# Patient Record
Sex: FEMALE | Race: BLACK OR AFRICAN AMERICAN | NOT HISPANIC OR LATINO | URBAN - METROPOLITAN AREA
[De-identification: names, ages, dates, MRNs, and addresses within clinical notes are randomized per-mention and may not be internally consistent; named-entity substitution may affect disease eponyms.]

---

## 2022-10-26 DIAGNOSIS — R19.7 DIARRHEA, UNSPECIFIED TYPE: Primary | ICD-10-CM

## 2023-02-15 ENCOUNTER — OFFICE VISIT (OUTPATIENT)
Dept: GASTROENTEROLOGY | Facility: CLINIC | Age: 28
End: 2023-02-15
Payer: MEDICAID

## 2023-02-15 VITALS
HEART RATE: 63 BPM | SYSTOLIC BLOOD PRESSURE: 121 MMHG | DIASTOLIC BLOOD PRESSURE: 75 MMHG | HEIGHT: 65 IN | BODY MASS INDEX: 26.69 KG/M2 | RESPIRATION RATE: 16 BRPM | WEIGHT: 160.19 LBS | OXYGEN SATURATION: 100 % | TEMPERATURE: 98 F

## 2023-02-15 DIAGNOSIS — R10.9 STOMACH CRAMPS: Primary | ICD-10-CM

## 2023-02-15 DIAGNOSIS — K21.9 GASTROESOPHAGEAL REFLUX DISEASE, UNSPECIFIED WHETHER ESOPHAGITIS PRESENT: ICD-10-CM

## 2023-02-15 DIAGNOSIS — R19.7 DIARRHEA, UNSPECIFIED TYPE: Primary | ICD-10-CM

## 2023-02-15 DIAGNOSIS — K52.9 CHRONIC DIARRHEA: ICD-10-CM

## 2023-02-15 DIAGNOSIS — Z72.0 TOBACCO USER: ICD-10-CM

## 2023-02-15 DIAGNOSIS — R63.4 WEIGHT LOSS: ICD-10-CM

## 2023-02-15 PROCEDURE — 1159F PR MEDICATION LIST DOCUMENTED IN MEDICAL RECORD: ICD-10-PCS | Mod: CPTII,,, | Performed by: NURSE PRACTITIONER

## 2023-02-15 PROCEDURE — 1160F RVW MEDS BY RX/DR IN RCRD: CPT | Mod: CPTII,,, | Performed by: NURSE PRACTITIONER

## 2023-02-15 PROCEDURE — 3078F DIAST BP <80 MM HG: CPT | Mod: CPTII,,, | Performed by: NURSE PRACTITIONER

## 2023-02-15 PROCEDURE — 1159F MED LIST DOCD IN RCRD: CPT | Mod: CPTII,,, | Performed by: NURSE PRACTITIONER

## 2023-02-15 PROCEDURE — 3008F PR BODY MASS INDEX (BMI) DOCUMENTED: ICD-10-PCS | Mod: CPTII,,, | Performed by: NURSE PRACTITIONER

## 2023-02-15 PROCEDURE — 1160F PR REVIEW ALL MEDS BY PRESCRIBER/CLIN PHARMACIST DOCUMENTED: ICD-10-PCS | Mod: CPTII,,, | Performed by: NURSE PRACTITIONER

## 2023-02-15 PROCEDURE — 99204 OFFICE O/P NEW MOD 45 MIN: CPT | Mod: S$PBB,,, | Performed by: NURSE PRACTITIONER

## 2023-02-15 PROCEDURE — 3008F BODY MASS INDEX DOCD: CPT | Mod: CPTII,,, | Performed by: NURSE PRACTITIONER

## 2023-02-15 PROCEDURE — 99214 OFFICE O/P EST MOD 30 MIN: CPT | Mod: PBBFAC | Performed by: NURSE PRACTITIONER

## 2023-02-15 PROCEDURE — 3078F PR MOST RECENT DIASTOLIC BLOOD PRESSURE < 80 MM HG: ICD-10-PCS | Mod: CPTII,,, | Performed by: NURSE PRACTITIONER

## 2023-02-15 PROCEDURE — 3074F PR MOST RECENT SYSTOLIC BLOOD PRESSURE < 130 MM HG: ICD-10-PCS | Mod: CPTII,,, | Performed by: NURSE PRACTITIONER

## 2023-02-15 PROCEDURE — 99204 PR OFFICE/OUTPT VISIT, NEW, LEVL IV, 45-59 MIN: ICD-10-PCS | Mod: S$PBB,,, | Performed by: NURSE PRACTITIONER

## 2023-02-15 PROCEDURE — 3074F SYST BP LT 130 MM HG: CPT | Mod: CPTII,,, | Performed by: NURSE PRACTITIONER

## 2023-02-15 RX ORDER — TRAZODONE HYDROCHLORIDE 50 MG/1
50 TABLET ORAL NIGHTLY
COMMUNITY
Start: 2023-01-27

## 2023-02-15 RX ORDER — SODIUM, POTASSIUM,MAG SULFATES 17.5-3.13G
1 SOLUTION, RECONSTITUTED, ORAL ORAL DAILY
Qty: 1 KIT | Refills: 0 | Status: SHIPPED | OUTPATIENT
Start: 2023-02-15 | End: 2023-02-17

## 2023-02-15 RX ORDER — OMEPRAZOLE 40 MG/1
40 CAPSULE, DELAYED RELEASE ORAL DAILY
Qty: 30 CAPSULE | Refills: 11 | Status: SHIPPED | OUTPATIENT
Start: 2023-02-15 | End: 2024-02-15

## 2023-02-15 RX ORDER — ALPRAZOLAM 0.5 MG/1
0.5 TABLET ORAL 2 TIMES DAILY PRN
COMMUNITY
Start: 2023-01-27

## 2023-02-15 NOTE — PROGRESS NOTES
Subjective:       Patient ID: Kacie Lizarraga is a 28 y.o. female.    Chief Complaint: Constipation, Diarrhea, Abdominal Pain, and Bloated    This 28-year-old  female with a history of cholecystectomy at age 10, Chiari malformation, vitamin D deficiency, and anxiety is referred for chronic diarrhea.  She presents accompanied by a friend.  She reports experiencing intermittent postprandial urgency and loose stools for the past 1 1/2 years with eating meat, as well as associated lower abdominal cramping, nausea, and decreased appetite.  She then became full vegan a few months ago and reports that bowel habits fluctuated between loose stools with fecal urgency and hard stools that are difficult to pass at times.  Lately, she has experienced more fecal urgency with loose stools a few times per day.  She denies any specific relieving factors.  She denies nocturnal awakening.  She has occasional nausea without vomiting.  She has decreased appetite.  She reports weighing 285 lb in December 2021 and is currently at 160 lb over the past 4 months.  She reports that this weight loss has been both intentional and unintentional.  She has frequent acid reflux that has been relieved with Tums as needed.  She denies fever, chills, odynophagia, dysphagia, or early satiety.  She denies melena, hematochezia, fecal incontinence, or pain with defecation.  She has occasional belching and bloating.      She denies ever having an EGD or colonoscopy done. She denies regular NSAID use or use of blood thinners. She smokes 5 cigarettes daily and denies alcohol use. She denies illicit drug use. She denies a family history of IBD, colon polyps, or colon cancer.    Review of patient's allergies indicates:   Allergen Reactions    Peanut Anaphylaxis, Hives, Itching and Rash    Codeine      Other reaction(s): Not Indicated     Past Medical History:   Diagnosis Date    Arnold-Chiari malformation      Past Surgical History:   Procedure  Laterality Date    DECOMPRESSION OF CHIARI MALFORMATION BY REMOVAL OF POSTERIOR ARCH OF FIRST CERVICAL VERTEBRA  08/14/2014     Family History:   family history includes Leukemia in her father; Thyroid disease in her mother.    Social History:    reports that she has been smoking cigarettes. She has been smoking an average of .5 packs per day. She has never used smokeless tobacco. She reports that she does not currently use alcohol. She reports that she does not currently use drugs.    Review of Systems  Negative except as noted in the HPI.      Objective:      Physical Exam  Constitutional:       Appearance: Normal appearance.   HENT:      Head: Normocephalic.      Mouth/Throat:      Mouth: Mucous membranes are moist.   Eyes:      Extraocular Movements: Extraocular movements intact.      Conjunctiva/sclera: Conjunctivae normal.      Pupils: Pupils are equal, round, and reactive to light.   Cardiovascular:      Rate and Rhythm: Normal rate and regular rhythm.      Pulses: Normal pulses.      Heart sounds: Normal heart sounds.   Pulmonary:      Effort: Pulmonary effort is normal.      Breath sounds: Normal breath sounds.   Abdominal:      General: Bowel sounds are normal.      Palpations: Abdomen is soft.   Musculoskeletal:         General: Normal range of motion.      Cervical back: Normal range of motion and neck supple.   Skin:     General: Skin is warm and dry.   Neurological:      General: No focal deficit present.      Mental Status: She is alert and oriented to person, place, and time.   Psychiatric:         Mood and Affect: Mood normal.         Behavior: Behavior normal.         Thought Content: Thought content normal.         Judgment: Judgment normal.       Home Medications:     Current Outpatient Medications   Medication Sig    ALPRAZolam (XANAX) 0.5 MG tablet Take 0.5 mg by mouth 2 (two) times daily as needed.    traZODone (DESYREL) 50 MG tablet Take 50 mg by mouth every evening.     Assessment/Plan:      Problem List Items Addressed This Visit          Endocrine    Weight loss     See plan for primary diagnosis         Relevant Orders    Case Request Endoscopy: EGD (ESOPHAGOGASTRODUODENOSCOPY), COLONOSCOPY (Completed)    CBC Auto Differential    Comprehensive Metabolic Panel    CRP, High Sensitivity    Lipase    Tissue Transglutaminase, IgA    TSH    X-Ray Abdomen Flat And Erect       GI    Chronic diarrhea     Recommend soluble fiber supplementation  CBC, CMP, CRP, TTG IgA, lipase, TSH  Can consider stool studies with reoccurrence of watery stools  Abdominal X-ray  EGD and colonoscopy  Call with updates         Relevant Orders    Case Request Endoscopy: EGD (ESOPHAGOGASTRODUODENOSCOPY), COLONOSCOPY (Completed)    CBC Auto Differential    Comprehensive Metabolic Panel    CRP, High Sensitivity    Lipase    Tissue Transglutaminase, IgA    TSH    X-Ray Abdomen Flat And Erect    Stomach cramps - Primary     See above         Relevant Orders    Case Request Endoscopy: EGD (ESOPHAGOGASTRODUODENOSCOPY), COLONOSCOPY (Completed)    CBC Auto Differential    Comprehensive Metabolic Panel    CRP, High Sensitivity    Lipase    Tissue Transglutaminase, IgA    TSH    X-Ray Abdomen Flat And Erect    Gastroesophageal reflux disease     See above  H. Pylori stool antigen  GERD lifestyle modifications  Reflux precautions  Avoid NSAID use  Recommend tobacco cessation  EGD  Start omeprazole 40 mg daily  Call with updates         Relevant Medications    omeprazole (PRILOSEC) 40 MG capsule    Other Relevant Orders    Case Request Endoscopy: EGD (ESOPHAGOGASTRODUODENOSCOPY), COLONOSCOPY (Completed)    Helicobacter Pylori Antigen Fecal EIA       Other    Tobacco user     Recommend tobacco cessation.         Relevant Orders    Ambulatory referral/consult to Smoking Cessation Program

## 2023-02-15 NOTE — LETTER
February 15, 2023      [facility]  [address] [facility] [address]  Phone: [phone]       Patient: Kacie Lizarraga   YOB: 1995  Date of Visit: 02/15/2023    To Whom It May Concern:    Zahra Lizarraga  was at [facility] on 02/15/2023. Please excuse Ms. Lizarraga on 02/14/2023-02/15/2023. The patient may return to work/school on 02/16/2023 with no restrictions. If you have any questions or concerns, or if I can be of further assistance, please do not hesitate to contact me.    Sincerely,    Duyen Guzman LPN

## 2023-02-15 NOTE — ASSESSMENT & PLAN NOTE
Recommend soluble fiber supplementation  CBC, CMP, CRP, TTG IgA, lipase, TSH  Can consider stool studies with reoccurrence of watery stools  Abdominal X-ray  EGD and colonoscopy  Call with updates

## 2023-02-15 NOTE — ASSESSMENT & PLAN NOTE
See above  H. Pylori stool antigen  GERD lifestyle modifications  Reflux precautions  Avoid NSAID use  Recommend tobacco cessation  EGD  Start omeprazole 40 mg daily  Call with updates

## 2023-03-23 ENCOUNTER — TELEPHONE (OUTPATIENT)
Dept: GASTROENTEROLOGY | Facility: CLINIC | Age: 28
End: 2023-03-23
Payer: MEDICAID

## 2023-03-23 NOTE — TELEPHONE ENCOUNTER
Unable to contact letter mailed.    Left message for return call regarding outstanding blood and stool tests.

## 2023-08-28 ENCOUNTER — TELEPHONE (OUTPATIENT)
Dept: ENDOSCOPY | Facility: HOSPITAL | Age: 28
End: 2023-08-28
Payer: MEDICAID

## 2023-08-28 NOTE — TELEPHONE ENCOUNTER
Attempted to contact patient on both numbers provided in chart, both numbers are not in service. MS